# Patient Record
Sex: MALE | Race: WHITE | NOT HISPANIC OR LATINO | Employment: FULL TIME | URBAN - METROPOLITAN AREA
[De-identification: names, ages, dates, MRNs, and addresses within clinical notes are randomized per-mention and may not be internally consistent; named-entity substitution may affect disease eponyms.]

---

## 2022-08-23 ENCOUNTER — OFFICE VISIT (OUTPATIENT)
Dept: MEDICAL GROUP | Facility: MEDICAL CENTER | Age: 22
End: 2022-08-23
Payer: COMMERCIAL

## 2022-08-23 VITALS
WEIGHT: 173.2 LBS | HEART RATE: 87 BPM | BODY MASS INDEX: 21.53 KG/M2 | DIASTOLIC BLOOD PRESSURE: 74 MMHG | SYSTOLIC BLOOD PRESSURE: 126 MMHG | OXYGEN SATURATION: 98 % | TEMPERATURE: 98 F | HEIGHT: 75 IN

## 2022-08-23 DIAGNOSIS — L64.9 MALE PATTERN BALDNESS: ICD-10-CM

## 2022-08-23 DIAGNOSIS — Z23 NEED FOR VACCINATION: ICD-10-CM

## 2022-08-23 DIAGNOSIS — F33.1 MODERATE EPISODE OF RECURRENT MAJOR DEPRESSIVE DISORDER (HCC): ICD-10-CM

## 2022-08-23 DIAGNOSIS — Z13.220 LIPID SCREENING: ICD-10-CM

## 2022-08-23 DIAGNOSIS — F90.0 ATTENTION DEFICIT HYPERACTIVITY DISORDER (ADHD), PREDOMINANTLY INATTENTIVE TYPE: ICD-10-CM

## 2022-08-23 PROCEDURE — 90471 IMMUNIZATION ADMIN: CPT | Performed by: FAMILY MEDICINE

## 2022-08-23 PROCEDURE — 99203 OFFICE O/P NEW LOW 30 MIN: CPT | Mod: 25 | Performed by: FAMILY MEDICINE

## 2022-08-23 PROCEDURE — 90651 9VHPV VACCINE 2/3 DOSE IM: CPT | Performed by: FAMILY MEDICINE

## 2022-08-23 RX ORDER — FINASTERIDE 1 MG/1
1 TABLET, FILM COATED ORAL DAILY
Qty: 90 TABLET | Refills: 3 | Status: SHIPPED | OUTPATIENT
Start: 2022-08-23 | End: 2023-01-30

## 2022-08-23 RX ORDER — FINASTERIDE 5 MG/1
5 TABLET, FILM COATED ORAL DAILY
COMMUNITY
End: 2022-08-23 | Stop reason: CLARIF

## 2022-08-23 ASSESSMENT — ENCOUNTER SYMPTOMS
DEPRESSION: 0
NERVOUS/ANXIOUS: 0
INSOMNIA: 0
ABDOMINAL PAIN: 0
SHORTNESS OF BREATH: 0
WEIGHT LOSS: 0

## 2022-08-23 ASSESSMENT — PATIENT HEALTH QUESTIONNAIRE - PHQ9
SUM OF ALL RESPONSES TO PHQ QUESTIONS 1-9: 2
CLINICAL INTERPRETATION OF PHQ2 SCORE: 2
5. POOR APPETITE OR OVEREATING: 0 - NOT AT ALL

## 2022-08-23 NOTE — ASSESSMENT & PLAN NOTE
Generalized receding hairline.  He has been taking 1 mg finasteride, he does think it helps.  He is not headed in a little while and feels like it has worsened.  Will provide refill.

## 2022-08-23 NOTE — ASSESSMENT & PLAN NOTE
Patient has been diagnosed with ADHD in the past.  Tells me he was last on Vyvanse.  Does not feel it is needed at this time because work is much different in school.

## 2022-08-23 NOTE — ASSESSMENT & PLAN NOTE
Patient previously treated with fluoxetine.  Has been off since he moved here this winter.  Feels good overall does not wish to go back at this time.  We will continue to monitor be available as needed.

## 2022-08-23 NOTE — LETTER
[facility] [employer]  Irvin Putnam D.O.  [address]  [place] [address]  Fax: [phone]   Authorization for Release/Disclosure of   Protected Health Information   Name: HUMAIRA LEONARDO : 2000 SSN: [id number]   Address: [address] [place] [address] [facility] [address]  [place] [address] Phone:    [phone] (home)    I authorize the entity listed below to release/disclose the PHI below to:   [facility] [employer]/Irvin Putnam D.O. and Irvin Putnam D.O.   Provider or Entity Name:  [facility]   Address   City, [facility]   Phone:      Fax:     Reason for request: continuity of care   Information to be released:    [  ] LAST COLONOSCOPY,  including any PATH REPORT and follow-up  [  ] LAST FIT/COLOGUARD RESULT [  ] LAST DEXA  [  ] LAST MAMMOGRAM  [  ] LAST PAP  [  ] LAST LABS [  ] RETINA EXAM REPORT  [  ] IMMUNIZATION RECORDS  [ xxx ] Release all info      [  ] Check here and initial the line next to each item to release ALL health information INCLUDING  _____ Care and treatment for drug and / or alcohol abuse  _____ HIV testing, infection status, or AIDS  _____ Genetic Testing    DATES OF SERVICE OR TIME PERIOD TO BE DISCLOSED: _____________  I understand and acknowledge that:  * This Authorization may be revoked at any time by you in writing, except if your health information has already been used or disclosed.  * Your health information that will be used or disclosed as a result of you signing this authorization could be re-disclosed by the recipient. If this occurs, your re-disclosed health information may no longer be protected by State or Federal laws.  * You may refuse to sign this Authorization. Your refusal will not affect your ability to obtain treatment.  * This Authorization becomes effective upon signing and will  on (date) __________.      If no date is indicated, this Authorization will  one (1) year from the signature date.    Name: Humaira Leonardo    Signature:    Date:     8/23/2022       PLEASE FAX REQUESTED RECORDS BACK TO: [phone]

## 2022-08-23 NOTE — PROGRESS NOTES
"Subjective:     CC:  Diagnoses of Moderate episode of recurrent major depressive disorder (HCC), Male pattern baldness, Attention deficit hyperactivity disorder (ADHD), predominantly inattentive type, Lipid screening, and Need for vaccination were pertinent to this visit.    HISTORY OF THE PRESENT ILLNESS: Patient is a 22 y.o. male. This pleasant patient is here today to establish care and discuss medication management and setting up lab work.     Moved from [place], works for [employer] as an , is .    Takes finasteride for receding hair line  No issues with urination  No rashes    Previously on fluoxetine for depression hasn't been on since January, overall feels well.    Had been on vivance for a few months  Feels like he is getting his work done    Problem   Moderate Episode of Recurrent Major Depressive Disorder (Hcc)   Male Pattern Baldness   Attention Deficit Hyperactivity Disorder (Adhd), Predominantly Inattentive Type       Current Outpatient Medications Ordered in Epic   Medication Sig Dispense Refill    finasteride (PROPECIA) 1 MG tablet Take 1 Tablet by mouth every day. 90 Tablet 3     No current Epic-ordered facility-administered medications on file.       Health Maintenance: HPV vaccine, records request pending    ROS:   Review of Systems   Constitutional:  Negative for weight loss.   Respiratory:  Negative for shortness of breath.    Cardiovascular:  Negative for chest pain.   Gastrointestinal:  Negative for abdominal pain.   Genitourinary:  Negative for dysuria, frequency and urgency.   Skin:  Negative for rash.   Psychiatric/Behavioral:  Negative for depression and suicidal ideas. The patient is not nervous/anxious and does not have insomnia.        Objective:       Exam: /74   Pulse 87   Temp 36.7 °C (98 °F) (Temporal)   Ht 1.905 m (6' 3\")   Wt 78.6 kg (173 lb 3.2 oz)   SpO2 98%  Body mass index is 21.65 kg/m².    Physical Exam  Vitals reviewed. "   Constitutional:       General: He is not in acute distress.     Appearance: Normal appearance.   HENT:      Head: Normocephalic and atraumatic.      Nose: Nose normal.   Eyes:      Extraocular Movements: Extraocular movements intact.   Cardiovascular:      Rate and Rhythm: Normal rate and regular rhythm.      Heart sounds: Normal heart sounds. No murmur (patient reports chronic soft murmur, I could not appreciate it today) heard.  Pulmonary:      Effort: Pulmonary effort is normal.      Breath sounds: Normal breath sounds.   Neurological:      Mental Status: He is alert.         Assessment & Plan:   22 y.o. male with the following -    Problem List Items Addressed This Visit       Moderate episode of recurrent major depressive disorder (HCC)     Patient previously treated with fluoxetine.  Has been off since he moved here this winter.  Feels good overall does not wish to go back at this time.  We will continue to monitor be available as needed.         Relevant Orders    CBC WITHOUT DIFFERENTIAL    Comp Metabolic Panel    TSH WITH REFLEX TO FT4    Male pattern baldness     Generalized receding hairline.  He has been taking 1 mg finasteride, he does think it helps.  He is not headed in a little while and feels like it has worsened.  Will provide refill.         Relevant Orders    CBC WITHOUT DIFFERENTIAL    TSH WITH REFLEX TO FT4    Attention deficit hyperactivity disorder (ADHD), predominantly inattentive type     Patient has been diagnosed with ADHD in the past.  Tells me he was last on Vyvanse.  Does not feel it is needed at this time because work is much different in school.         Relevant Orders    Comp Metabolic Panel    TSH WITH REFLEX TO FT4     Other Visit Diagnoses       Lipid screening        Relevant Orders    Lipid Profile    Need for vaccination        Relevant Orders    Gardasil 9 (Completed)              Return in about 1 year (around 8/23/2023), or if symptoms worsen or fail to improve, for  Annual Visit.    Please note that this dictation was created using voice recognition software. I have made every reasonable attempt to correct obvious errors, but I expect that there are errors of grammar and possibly content that I did not discover before finalizing the note.

## 2023-01-30 RX ORDER — FINASTERIDE 1 MG/1
1 TABLET, FILM COATED ORAL DAILY
Qty: 90 TABLET | Refills: 3 | Status: SHIPPED
Start: 2023-01-30 | End: 2023-08-18 | Stop reason: SDUPTHER

## 2023-08-18 ENCOUNTER — OFFICE VISIT (OUTPATIENT)
Dept: MEDICAL GROUP | Facility: MEDICAL CENTER | Age: 23
End: 2023-08-18
Payer: COMMERCIAL

## 2023-08-18 VITALS
OXYGEN SATURATION: 99 % | SYSTOLIC BLOOD PRESSURE: 128 MMHG | HEART RATE: 81 BPM | TEMPERATURE: 98.1 F | BODY MASS INDEX: 21.49 KG/M2 | DIASTOLIC BLOOD PRESSURE: 78 MMHG | HEIGHT: 75 IN | WEIGHT: 172.8 LBS

## 2023-08-18 DIAGNOSIS — Z00.00 WELLNESS EXAMINATION: ICD-10-CM

## 2023-08-18 DIAGNOSIS — Z11.59 NEED FOR HEPATITIS C SCREENING TEST: ICD-10-CM

## 2023-08-18 DIAGNOSIS — Z23 NEED FOR VACCINATION: ICD-10-CM

## 2023-08-18 DIAGNOSIS — L64.9 MALE PATTERN BALDNESS: ICD-10-CM

## 2023-08-18 DIAGNOSIS — Z13.6 SCREENING FOR CARDIOVASCULAR CONDITION: ICD-10-CM

## 2023-08-18 DIAGNOSIS — N62 GYNECOMASTIA: ICD-10-CM

## 2023-08-18 PROCEDURE — 90471 IMMUNIZATION ADMIN: CPT | Performed by: FAMILY MEDICINE

## 2023-08-18 PROCEDURE — 3074F SYST BP LT 130 MM HG: CPT | Performed by: FAMILY MEDICINE

## 2023-08-18 PROCEDURE — 99395 PREV VISIT EST AGE 18-39: CPT | Mod: 25 | Performed by: FAMILY MEDICINE

## 2023-08-18 PROCEDURE — 90651 9VHPV VACCINE 2/3 DOSE IM: CPT | Performed by: FAMILY MEDICINE

## 2023-08-18 PROCEDURE — 3078F DIAST BP <80 MM HG: CPT | Performed by: FAMILY MEDICINE

## 2023-08-18 RX ORDER — FINASTERIDE 1 MG/1
1 TABLET, FILM COATED ORAL DAILY
Qty: 90 TABLET | Refills: 3 | Status: SHIPPED | OUTPATIENT
Start: 2023-08-18

## 2023-08-18 ASSESSMENT — PATIENT HEALTH QUESTIONNAIRE - PHQ9: CLINICAL INTERPRETATION OF PHQ2 SCORE: 0

## 2023-08-18 NOTE — LETTER
[facility]  Irvin Putnam D.O.  [address]  [place] [address]  Fax: [phone]   Authorization for Release/Disclosure of   Protected Health Information   Name: HUMAIRA LEONARDO : 2000 SSN: [id number]   Address: [address] [place] [address] [facility] [address]  [place] [address] Phone:    [phone] (home)    I authorize the entity listed below to release/disclose the PHI below to:   [facility]/Irvin Putnam D.O. and Irvin Putnam D.O.   Provider or Entity Name:  [facility]   Address   City, [facility] [place], [place] Phone:      Fax:     Reason for request: continuity of care   Information to be released:    [  ] LAST COLONOSCOPY,  including any PATH REPORT and follow-up  [  ] LAST FIT/COLOGUARD RESULT [  ] LAST DEXA  [  ] LAST MAMMOGRAM  [  ] LAST PAP  [  ] LAST LABS [  ] RETINA EXAM REPORT  [  ] IMMUNIZATION RECORDS  [xxxxx  ] Release all info      [  ] Check here and initial the line next to each item to release ALL health information INCLUDING  _____ Care and treatment for drug and / or alcohol abuse  _____ HIV testing, infection status, or AIDS  _____ Genetic Testing    DATES OF SERVICE OR TIME PERIOD TO BE DISCLOSED: _____________  I understand and acknowledge that:  * This Authorization may be revoked at any time by you in writing, except if your health information has already been used or disclosed.  * Your health information that will be used or disclosed as a result of you signing this authorization could be re-disclosed by the recipient. If this occurs, your re-disclosed health information may no longer be protected by State or Federal laws.  * You may refuse to sign this Authorization. Your refusal will not affect your ability to obtain treatment.  * This Authorization becomes effective upon signing and will  on (date) __________.      If no date is indicated, this Authorization will  one (1) year from the signature date.    Name: Humaira Leonardo  Signature:  Date:   8/18/2023     PLEASE FAX REQUESTED RECORDS BACK TO: [phone]

## 2023-08-18 NOTE — LETTER
[facility]  Irvin Putnam D.O.  [address]  [place] [address]  Fax: [phone]   Authorization for Release/Disclosure of   Protected Health Information   Name: HUMAIRA LEONARDO : 2000 SSN: [id number]   Address: [address] [place] [address] [facility] [address]  [place] [address] Phone:    [phone] (home)    I authorize the entity listed below to release/disclose the PHI below to:   [facility]/Ivrin Putnam D.O. and Irvin Putnam D.O.   Provider or Entity Name:  [facility]   Address   City, State, [place], [place] Phone:      Fax:     Reason for request: continuity of care   Information to be released:    [  ] LAST COLONOSCOPY,  including any PATH REPORT and follow-up  [  ] LAST FIT/COLOGUARD RESULT [  ] LAST DEXA  [  ] LAST MAMMOGRAM  [  ] LAST PAP  [  ] LAST LABS [  ] RETINA EXAM REPORT  [  ] IMMUNIZATION RECORDS  [xxxx  ] Release all info      [  ] Check here and initial the line next to each item to release ALL health information INCLUDING  _____ Care and treatment for drug and / or alcohol abuse  _____ HIV testing, infection status, or AIDS  _____ Genetic Testing    DATES OF SERVICE OR TIME PERIOD TO BE DISCLOSED: _____________  I understand and acknowledge that:  * This Authorization may be revoked at any time by you in writing, except if your health information has already been used or disclosed.  * Your health information that will be used or disclosed as a result of you signing this authorization could be re-disclosed by the recipient. If this occurs, your re-disclosed health information may no longer be protected by State or Federal laws.  * You may refuse to sign this Authorization. Your refusal will not affect your ability to obtain treatment.  * This Authorization becomes effective upon signing and will  on (date) __________.      If no date is indicated, this Authorization will  one (1) year from the signature date.    Name: Humaira Leonardo  Signature: Date:    8/18/2023     PLEASE FAX REQUESTED RECORDS BACK TO: [phone]

## 2023-08-18 NOTE — PROGRESS NOTES
Subjective:     CC:   Chief Complaint   Patient presents with    Annual Exam       HPI:   Darrell Leonardo is a 23 y.o. male who presents for an annual exam. He is feeling well and has no complaints.    Health Maintenance  PT/vit D for falls prevention: pending  Cholesterol Screening: pending  Diabetes Screening: pending  Diet: Burritos, fried chicken, sandwiches, getting some fish and quinoa; not a regular breakfast eater, having about 200 mg of caffeine per day, does well with water. Lunch at work can be healthy. Dinner sometimes out.  Exercise: Getting back into exercise, been to gym twice this week, mostly weight lifting, lot of stairs and walking at work, averaging 6,400 steps per day  Substance Abuse: Non-smoker, once a week will have a few drinks, no other problems  Safe in relationship.   Seat belts, bike helmet, gun safety discussed.  Sun protection used.    Cancer screening  Colorectal Cancer Screening: average risk can start at 45  Lung Cancer Screening: n/a  Prostate Cancer Screening/PSA: average risk can start around age 50    Infectious disease screening/Immunizations  --STI Screening: declined  --Practices safe sex.  --HIV Screening: declined  --Hepatitis C Screening: pending  --Immunizations:    Influenza: yearly   HPV:  HPV #2 of 3   Tetanus: record request   Shingles: n/a    Pneumococcal : completed     COVID-19: declined  Other immunizations: record request    He  has no past medical history on file.  He  has a past surgical history that includes inguinal hernia repair (2003).  Family History   Problem Relation Age of Onset    Breast Cancer Maternal Grandmother 75    Heart Attack Maternal Grandfather 50     Social History     Tobacco Use    Smoking status: Never    Smokeless tobacco: Never   Vaping Use    Vaping Use: Never used   Substance Use Topics    Alcohol use: Yes     Alcohol/week: 4.8 oz     Types: 8 Cans of beer per week     Comment: twice a week    Drug use: Not Currently     Types:  "Marijuana       Patient Active Problem List    Diagnosis Date Noted    Moderate episode of recurrent major depressive disorder (HCC) 08/23/2022    Male pattern baldness 08/23/2022    Attention deficit hyperactivity disorder (ADHD), predominantly inattentive type 08/23/2022       Current Outpatient Medications   Medication Sig Dispense Refill    finasteride (PROPECIA) 1 MG tablet Take 1 Tablet by mouth every day. 90 Tablet 3     No current facility-administered medications for this visit.    (including changes today)  Allergies: Patient has no allergy information on record.    Review of Systems   Constitutional: Negative for fever, chills and malaise/fatigue.   HENT: Negative for congestion.    Eyes: Negative for pain.   Respiratory: Negative for cough and shortness of breath.    Cardiovascular: Negative for leg swelling.   Gastrointestinal: Negative for nausea, vomiting, abdominal pain and diarrhea.   Genitourinary: Negative for dysuria and hematuria.   Skin: Negative for rash.   Neurological: Negative for dizziness, focal weakness and headaches.   Endo/Heme/Allergies: Does not bleed easily.   Psychiatric/Behavioral: Negative for depression.  The patient is not nervous/anxious.      Objective:     /78   Pulse 81   Temp 36.7 °C (98.1 °F) (Temporal)   Ht 1.905 m (6' 3\")   Wt 78.4 kg (172 lb 12.8 oz)   SpO2 99%   BMI 21.60 kg/m²   Body mass index is 21.6 kg/m².  Wt Readings from Last 4 Encounters:   08/18/23 78.4 kg (172 lb 12.8 oz)   08/23/22 78.6 kg (173 lb 3.2 oz)       Physical Exam:  Constitutional: Well-developed and well-nourished. Not diaphoretic. No distress.   Skin: Skin is warm and dry. No rash noted.  Head: Atraumatic without lesions.  Eyes: Conjunctivae and extraocular motions are normal. Pupils are equal, round, and reactive to light. No scleral icterus.   Ears:  External ears unremarkable.   Nose: Nares patent. Septum midline.   Neck: Supple, trachea midline. Normal range of motion. " .  Cardiovascular: Regular rate and rhythm, S1 and S2 without murmur, rubs, or gallops. Some increased fat pad of right breast, no discreet masses, tender to deep palpation.  Lungs: Effort normal. Clear to auscultation throughout. No adventitious sounds. No CVA tenderness.  Abdomen: Soft, non tender, and without distention. Active bowel sounds in all four quadrants.   Extremities: No cyanosis, clubbing, erythema, nor edema. Distal pulses intact and symmetric.   Musculoskeletal: All major joints AROM full in all directions without pain.  Neurological: Alert and oriented x 3.   Psychiatric:  Behavior, mood, and affect are appropriate.      Assessment and Plan:     1. Wellness examination  CBC WITH DIFFERENTIAL    Comp Metabolic Panel    Lipid Profile    TSH WITH REFLEX TO FT4    VITAMIN D,25 HYDROXY (DEFICIENCY)    HEP C VIRUS ANTIBODY      2. Need for vaccination  Gardasil 9      3. Need for hepatitis C screening test  HEP C VIRUS ANTIBODY      4. Screening for cardiovascular condition  Comp Metabolic Panel    Lipid Profile    TSH WITH REFLEX TO FT4      5. Male pattern baldness  TSH WITH REFLEX TO FT4    TESTOSTERONE SERUM    PROLACTIN    finasteride (PROPECIA) 1 MG tablet    Referral to Dermatology      6. Gynecomastia  TSH WITH REFLEX TO FT4    TESTOSTERONE SERUM    PROLACTIN          HCM: completed.  Labs per orders.  Vaccinations per orders.  Age appropriate guidance and Counseling about diet, supplements, exercise, skin care and safe sex.    Follow-up: Return in about 1 year (around 8/18/2024), or if symptoms worsen or fail to improve, for Annual Visit.

## 2024-12-30 DIAGNOSIS — L64.9 MALE PATTERN BALDNESS: ICD-10-CM

## 2024-12-30 DIAGNOSIS — Z13.6 SCREENING FOR CARDIOVASCULAR CONDITION: ICD-10-CM

## 2024-12-30 DIAGNOSIS — Z11.3 SCREENING EXAMINATION FOR SEXUALLY TRANSMITTED DISEASE: ICD-10-CM

## 2024-12-30 DIAGNOSIS — Z13.21 ENCOUNTER FOR VITAMIN DEFICIENCY SCREENING: ICD-10-CM

## 2024-12-30 DIAGNOSIS — Z11.59 NEED FOR HEPATITIS C SCREENING TEST: ICD-10-CM

## 2024-12-30 DIAGNOSIS — N62 GYNECOMASTIA: ICD-10-CM

## 2025-05-16 ENCOUNTER — OFFICE VISIT (OUTPATIENT)
Dept: MEDICAL GROUP | Facility: MEDICAL CENTER | Age: 25
End: 2025-05-16
Payer: COMMERCIAL

## 2025-05-16 VITALS
HEART RATE: 100 BPM | SYSTOLIC BLOOD PRESSURE: 108 MMHG | RESPIRATION RATE: 18 BRPM | DIASTOLIC BLOOD PRESSURE: 54 MMHG | WEIGHT: 177.2 LBS | OXYGEN SATURATION: 100 % | HEIGHT: 74 IN | TEMPERATURE: 97.7 F | BODY MASS INDEX: 22.74 KG/M2

## 2025-05-16 DIAGNOSIS — Z00.00 LABORATORY EXAMINATION ORDERED AS PART OF A ROUTINE GENERAL MEDICAL EXAMINATION: ICD-10-CM

## 2025-05-16 DIAGNOSIS — Z00.00 ROUTINE GENERAL MEDICAL EXAMINATION AT A HEALTH CARE FACILITY: Primary | ICD-10-CM

## 2025-05-16 DIAGNOSIS — Z11.59 NEED FOR HEPATITIS C SCREENING TEST: ICD-10-CM

## 2025-05-16 PROCEDURE — 3078F DIAST BP <80 MM HG: CPT | Performed by: FAMILY MEDICINE

## 2025-05-16 PROCEDURE — 3074F SYST BP LT 130 MM HG: CPT | Performed by: FAMILY MEDICINE

## 2025-05-16 PROCEDURE — 99395 PREV VISIT EST AGE 18-39: CPT | Performed by: FAMILY MEDICINE

## 2025-05-16 ASSESSMENT — ENCOUNTER SYMPTOMS
DIARRHEA: 0
ORTHOPNEA: 0
HEARTBURN: 0
MYALGIAS: 0
FEVER: 0
HALLUCINATIONS: 0
WEAKNESS: 0
NECK PAIN: 0
FOCAL WEAKNESS: 0
NERVOUS/ANXIOUS: 0
SPEECH CHANGE: 0
PALPITATIONS: 0
DIAPHORESIS: 0
WHEEZING: 0
HEADACHES: 0
SENSORY CHANGE: 0
BACK PAIN: 0
TREMORS: 0
CHILLS: 0
COUGH: 0
MEMORY LOSS: 0
DOUBLE VISION: 0
NAUSEA: 0
SPUTUM PRODUCTION: 0
ABDOMINAL PAIN: 0
INSOMNIA: 0
BLOOD IN STOOL: 0
BRUISES/BLEEDS EASILY: 0
TINGLING: 0
DEPRESSION: 0
WEIGHT LOSS: 0
VOMITING: 0
SORE THROAT: 0
DIZZINESS: 0
SHORTNESS OF BREATH: 0
BLURRED VISION: 0

## 2025-05-16 ASSESSMENT — PATIENT HEALTH QUESTIONNAIRE - PHQ9
3. TROUBLE FALLING OR STAYING ASLEEP OR SLEEPING TOO MUCH: NOT AT ALL
4. FEELING TIRED OR HAVING LITTLE ENERGY: NOT AT ALL
SUM OF ALL RESPONSES TO PHQ9 QUESTIONS 1 AND 2: 0
2. FEELING DOWN, DEPRESSED, IRRITABLE, OR HOPELESS: NOT AT ALL
5. POOR APPETITE OR OVEREATING: NOT AT ALL
1. LITTLE INTEREST OR PLEASURE IN DOING THINGS: NOT AT ALL
6. FEELING BAD ABOUT YOURSELF - OR THAT YOU ARE A FAILURE OR HAVE LET YOURSELF OR YOUR FAMILY DOWN: NOT AL ALL
SUM OF ALL RESPONSES TO PHQ QUESTIONS 1-9: 0
8. MOVING OR SPEAKING SO SLOWLY THAT OTHER PEOPLE COULD HAVE NOTICED. OR THE OPPOSITE, BEING SO FIGETY OR RESTLESS THAT YOU HAVE BEEN MOVING AROUND A LOT MORE THAN USUAL: NOT AT ALL
9. THOUGHTS THAT YOU WOULD BE BETTER OFF DEAD, OR OF HURTING YOURSELF: NOT AT ALL
7. TROUBLE CONCENTRATING ON THINGS, SUCH AS READING THE NEWSPAPER OR WATCHING TELEVISION: NOT AT ALL

## 2025-05-16 ASSESSMENT — LIFESTYLE VARIABLES: SUBSTANCE_ABUSE: 0

## 2025-05-16 NOTE — PROGRESS NOTES
"Subjective     Darrell Leonardo is a 25 y.o. male who presents with Annual Exam        He is here for his annual examination    HPI     has no past medical history on file.   has a past surgical history that includes inguinal hernia repair (2003).  family history includes Breast Cancer (age of onset: 75) in his maternal grandmother; Heart Attack (age of onset: 50) in his maternal grandfather.   reports that he has never smoked. He has never used smokeless tobacco. He reports current alcohol use of about 4.8 oz of alcohol per week. He reports that he does not currently use drugs after having used the following drugs: Marijuana.    Current Medications[1]  has no known allergies.    Review of Systems   Constitutional:  Negative for chills, diaphoresis, fever, malaise/fatigue and weight loss.   HENT:  Negative for congestion, hearing loss, sore throat and tinnitus.    Eyes:  Negative for blurred vision and double vision.   Respiratory:  Negative for cough, sputum production, shortness of breath and wheezing.    Cardiovascular:  Negative for chest pain, palpitations, orthopnea and leg swelling.   Gastrointestinal:  Negative for abdominal pain, blood in stool, diarrhea, heartburn, nausea and vomiting.   Genitourinary:  Negative for dysuria, frequency, hematuria and urgency.   Musculoskeletal:  Negative for back pain, joint pain, myalgias and neck pain.   Skin:  Negative for rash.   Neurological:  Negative for dizziness, tingling, tremors, sensory change, speech change, focal weakness, weakness and headaches.   Endo/Heme/Allergies:  Positive for environmental allergies. Does not bruise/bleed easily.   Psychiatric/Behavioral:  Negative for depression, hallucinations, memory loss, substance abuse and suicidal ideas. The patient is not nervous/anxious and does not have insomnia.           Objective     /54   Pulse 100   Temp 36.5 °C (97.7 °F) (Temporal)   Resp 18   Ht 1.88 m (6' 2\")   Wt 80.4 kg (177 lb 3.2 oz)   " SpO2 100%   BMI 22.75 kg/m²      Physical Exam  Vitals reviewed.   Constitutional:       Appearance: He is well-developed.   HENT:      Head: Normocephalic and atraumatic.   Eyes:      General:         Left eye: No discharge.      Pupils: Pupils are equal, round, and reactive to light.   Neck:      Thyroid: No thyromegaly.      Vascular: No JVD.   Cardiovascular:      Rate and Rhythm: Normal rate and regular rhythm.      Heart sounds: Normal heart sounds. No murmur heard.  Pulmonary:      Effort: Pulmonary effort is normal. No respiratory distress.      Breath sounds: Normal breath sounds. No wheezing or rales.   Abdominal:      General: Bowel sounds are normal. There is no distension.      Palpations: Abdomen is soft. There is no mass.      Tenderness: There is no abdominal tenderness.   Musculoskeletal:         General: Normal range of motion.      Cervical back: Normal range of motion and neck supple.   Lymphadenopathy:      Cervical: No cervical adenopathy.   Skin:     General: Skin is warm and dry.      Findings: No erythema or rash.   Neurological:      Mental Status: He is alert and oriented to person, place, and time.      Coordination: Coordination normal.   Psychiatric:         Mood and Affect: Mood normal.         Behavior: Behavior normal.              Anticipatory guidance:  seatbelt worn.  Immunizations discussed.  Will check on immunizations.  Colonoscopy is recommended at 45.  Prostate screening at age 50.    Assessment & Plan  Routine general medical examination at a health care facility  He is generally well       Laboratory examination ordered as part of a routine general medical examination  Routine orders discussed and place  Orders:    Comp Metabolic Panel; Future    CBC WITHOUT DIFFERENTIAL; Future    TSH; Future    Lipid Profile; Future    Need for hepatitis C screening test  Routine screening test order placed  Orders:    HEP C VIRUS ANTIBODY; Future    Recheck one year, sooner as needed                   [1]   Current Outpatient Medications:     finasteride (PROPECIA) 1 MG tablet, Take 1 Tablet by mouth every day. (Patient not taking: Reported on 5/16/2025), Disp: 90 Tablet, Rfl: 3